# Patient Record
Sex: MALE | Race: ASIAN | Employment: STUDENT | URBAN - METROPOLITAN AREA
[De-identification: names, ages, dates, MRNs, and addresses within clinical notes are randomized per-mention and may not be internally consistent; named-entity substitution may affect disease eponyms.]

---
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
## 2020-09-04 ENCOUNTER — OFFICE VISIT (OUTPATIENT)
Dept: INTERNAL MEDICINE CLINIC | Facility: CLINIC | Age: 20
End: 2020-09-04
Payer: COMMERCIAL

## 2020-09-04 VITALS
DIASTOLIC BLOOD PRESSURE: 60 MMHG | TEMPERATURE: 98 F | SYSTOLIC BLOOD PRESSURE: 110 MMHG | HEART RATE: 60 BPM | HEIGHT: 71.25 IN | WEIGHT: 162 LBS | BODY MASS INDEX: 22.43 KG/M2

## 2020-09-04 DIAGNOSIS — Z02.5 ROUTINE SPORTS PHYSICAL EXAM: Primary | ICD-10-CM

## 2020-09-04 PROCEDURE — 99385 PREV VISIT NEW AGE 18-39: CPT | Performed by: NURSE PRACTITIONER

## 2020-09-04 PROCEDURE — 3078F DIAST BP <80 MM HG: CPT | Performed by: NURSE PRACTITIONER

## 2020-09-04 PROCEDURE — 3074F SYST BP LT 130 MM HG: CPT | Performed by: NURSE PRACTITIONER

## 2020-09-04 PROCEDURE — 3008F BODY MASS INDEX DOCD: CPT | Performed by: NURSE PRACTITIONER

## 2020-09-04 NOTE — PROGRESS NOTES
CHIEF COMPLAINT:   Patient presents with:  Sports Physical: not fasting        HPI:   Joel Lindsay is a 23year old male who presents for a sports physical exam. Patient will be participating in soccer.   Patient attends school and is a sophomore in college PSYCHE: no symptoms of depression or anxiety  HEMATOLOGY: denies hx anemia; denies bruising or excessive bleeding  ALLERGY/IMM.: denies food or seasonal allergies    EXAM:   /60 (BP Location: Left arm, Patient Position: Sitting, Cuff Size: adult) Status current - will obtain records    Patient is cleared for sports without restrictions. Form filled out and given to patient/parent. Copy of form sent to be scanned into patient's chart.    The patient is asked to return or see PCP for CPX in 1 year i

## 2021-01-05 ENCOUNTER — OFFICE VISIT (OUTPATIENT)
Dept: INTERNAL MEDICINE CLINIC | Facility: CLINIC | Age: 21
End: 2021-01-05
Payer: COMMERCIAL

## 2021-01-05 VITALS
HEIGHT: 71.25 IN | SYSTOLIC BLOOD PRESSURE: 106 MMHG | HEART RATE: 56 BPM | WEIGHT: 167 LBS | DIASTOLIC BLOOD PRESSURE: 66 MMHG | RESPIRATION RATE: 14 BRPM | BODY MASS INDEX: 23.12 KG/M2

## 2021-01-05 DIAGNOSIS — S76.111A STRAIN OF RIGHT QUADRICEPS MUSCLE, INITIAL ENCOUNTER: Primary | ICD-10-CM

## 2021-01-05 PROCEDURE — 99213 OFFICE O/P EST LOW 20 MIN: CPT | Performed by: NURSE PRACTITIONER

## 2021-01-05 PROCEDURE — 3074F SYST BP LT 130 MM HG: CPT | Performed by: NURSE PRACTITIONER

## 2021-01-05 PROCEDURE — 3008F BODY MASS INDEX DOCD: CPT | Performed by: NURSE PRACTITIONER

## 2021-01-05 PROCEDURE — 3078F DIAST BP <80 MM HG: CPT | Performed by: NURSE PRACTITIONER

## 2021-01-05 NOTE — PROGRESS NOTES
CHIEF COMPLAINT:     Patient presents with:  Groin Pain: 3x weeks right side has been lifting weights prior . OTC stretching, ibuprofen       HPI:   Isha Randhawa is a 21year old male here with c/o R side groin pain.  Was playing soccer and heard a pop in his

## 2021-01-05 NOTE — PATIENT INSTRUCTIONS
Groin Strain (Adult)  A groin strain is a stretching or partial tearing of the muscle in the lower belly (abdomen) or upper thigh. This may happen because of too much coughing, heavy lifting, or active sports.  The pain may last for several days or weeks, © 9336-9742 [employer] [address] [facility] [place]. All rights reserved. This information is not intended as a substitute for professional medical care. Always follow your healthcare professional's instructions.

## 2021-01-06 ENCOUNTER — TELEPHONE (OUTPATIENT)
Dept: INTERNAL MEDICINE CLINIC | Facility: CLINIC | Age: 21
End: 2021-01-06

## 2021-01-06 NOTE — TELEPHONE ENCOUNTER
Faxed signed initial evaluation back to [facility]   Fax number [phone]   Fax confirmation received   Sent to scan and copy placed in accordion

## 2021-05-06 ENCOUNTER — HOSPITAL ENCOUNTER (OUTPATIENT)
Age: 21
Discharge: HOME | End: 2021-05-06
Attending: EMERGENCY MEDICINE
Payer: COMMERCIAL

## 2021-05-06 VITALS
OXYGEN SATURATION: 98 % | RESPIRATION RATE: 14 BRPM | SYSTOLIC BLOOD PRESSURE: 140 MMHG | DIASTOLIC BLOOD PRESSURE: 62 MMHG | HEART RATE: 50 BPM | TEMPERATURE: 98 F

## 2021-05-06 DIAGNOSIS — U07.1 COVID-19: Primary | ICD-10-CM

## 2021-05-06 PROCEDURE — 99213 OFFICE O/P EST LOW 20 MIN: CPT

## 2021-05-06 PROCEDURE — 99202 OFFICE O/P NEW SF 15 MIN: CPT

## 2021-05-06 NOTE — ED PROVIDER NOTES
Patient Seen in: [facility]      History   Patient presents with:  Covid-19 Test    Stated Complaint: covid test    HPI/Subjective:   HPI    27-year-old male comes to the hospital complaint of requesting a Covid test.  He is asymptomatic. discussed. The patient is discharged in good condition.                                 Disposition and Plan     Clinical Impression:  [id number]  (primary encounter diagnosis)     Disposition:  Discharge  5/6/2021 12:27 pm    Follow-up:  Deedee Maya

## 2021-05-09 ENCOUNTER — HOSPITAL ENCOUNTER (OUTPATIENT)
Age: 21
Discharge: HOME | End: 2021-05-09
Payer: COMMERCIAL

## 2021-05-09 VITALS
HEART RATE: 51 BPM | OXYGEN SATURATION: 98 % | SYSTOLIC BLOOD PRESSURE: 129 MMHG | DIASTOLIC BLOOD PRESSURE: 70 MMHG | RESPIRATION RATE: 16 BRPM | TEMPERATURE: 99 F

## 2021-05-09 DIAGNOSIS — Z20.822 ENCOUNTER FOR LABORATORY TESTING FOR COVID-19 VIRUS: Primary | ICD-10-CM

## 2021-05-09 PROCEDURE — 99202 OFFICE O/P NEW SF 15 MIN: CPT | Performed by: NURSE PRACTITIONER

## 2021-05-09 NOTE — ED PROVIDER NOTES
Patient Seen in: [facility] [address] [place], [place] [address]      History   Patient presents with:  Covid-19 Test: PCR Test for Travel - Entered by patient    Stated Complaint: Covid-19 Test - PCR Test for Travel    HPI/Subjective:   HPI  66-year-old male presents t pm    Follow-up:  Perez Pelayo MD  [address] [place] [address]  [phone]    Call   As needed          Medications Prescribed:  There are no discharge medications for this patient.

## 2021-06-10 ENCOUNTER — TELEPHONE (OUTPATIENT)
Dept: INTERNAL MEDICINE CLINIC | Facility: CLINIC | Age: 21
End: 2021-06-10

## 2021-06-10 DIAGNOSIS — S76.319A HAMSTRING STRAIN, UNSPECIFIED LATERALITY, INITIAL ENCOUNTER: Primary | ICD-10-CM

## 2021-06-10 NOTE — TELEPHONE ENCOUNTER
Patients mother called in requesting a referral to physical therapist.     Patient has a pulled hamstring , [facility] on [address]  Fax [phone]

## 2021-06-17 ENCOUNTER — TELEPHONE (OUTPATIENT)
Dept: INTERNAL MEDICINE CLINIC | Facility: CLINIC | Age: 21
End: 2021-06-17

## 2021-06-17 DIAGNOSIS — Z20.822 ENCOUNTER FOR SCREENING LABORATORY TESTING FOR COVID-19 VIRUS: Primary | ICD-10-CM

## 2021-06-17 NOTE — TELEPHONE ENCOUNTER
Patient mother made aware of order and provided with central scheduling phone number.  States she will try local pharmacies for rapid covid test.

## 2021-06-17 NOTE — TELEPHONE ENCOUNTER
We do not have the ability to order rapid COVID tests. Usually our COVID tests take 1-2 days to come back. Can call central scheduling at [phone] to schedule. For a rapid test he is better off trying a travel clinic or pharmacies.   I believe [facility]

## 2021-06-17 NOTE — TELEPHONE ENCOUNTER
Patients mother called in requesting orders be placed for a PCR COVID TEST and RAPID COVID test. The patient will be leaving to [place]. Please call the mother and advise the orders are in.

## 2021-06-18 ENCOUNTER — LAB ENCOUNTER (OUTPATIENT)
Dept: LAB | Age: 21
End: 2021-06-18
Attending: INTERNAL MEDICINE
Payer: COMMERCIAL

## 2021-06-18 DIAGNOSIS — Z20.822 ENCOUNTER FOR SCREENING LABORATORY TESTING FOR COVID-19 VIRUS: ICD-10-CM

## 2022-05-31 ENCOUNTER — TELEPHONE (OUTPATIENT)
Dept: INTERNAL MEDICINE CLINIC | Facility: CLINIC | Age: 22
End: 2022-05-31

## 2022-05-31 ENCOUNTER — HOSPITAL ENCOUNTER (OUTPATIENT)
Dept: GENERAL RADIOLOGY | Age: 22
Discharge: HOME | End: 2022-05-31
Attending: PHYSICIAN ASSISTANT
Payer: COMMERCIAL

## 2022-05-31 ENCOUNTER — OFFICE VISIT (OUTPATIENT)
Dept: INTERNAL MEDICINE CLINIC | Facility: CLINIC | Age: 22
End: 2022-05-31
Payer: COMMERCIAL

## 2022-05-31 VITALS
RESPIRATION RATE: 16 BRPM | SYSTOLIC BLOOD PRESSURE: 121 MMHG | BODY MASS INDEX: 23.71 KG/M2 | WEIGHT: 169.38 LBS | DIASTOLIC BLOOD PRESSURE: 80 MMHG | HEART RATE: 51 BPM | TEMPERATURE: 98 F | HEIGHT: 71 IN | OXYGEN SATURATION: 99 %

## 2022-05-31 DIAGNOSIS — Z00.00 ANNUAL PHYSICAL EXAM: Primary | ICD-10-CM

## 2022-05-31 DIAGNOSIS — M79.672 ACUTE FOOT PAIN, LEFT: ICD-10-CM

## 2022-05-31 PROCEDURE — 3008F BODY MASS INDEX DOCD: CPT | Performed by: PHYSICIAN ASSISTANT

## 2022-05-31 PROCEDURE — 3079F DIAST BP 80-89 MM HG: CPT | Performed by: PHYSICIAN ASSISTANT

## 2022-05-31 PROCEDURE — 99213 OFFICE O/P EST LOW 20 MIN: CPT | Performed by: PHYSICIAN ASSISTANT

## 2022-05-31 PROCEDURE — 3074F SYST BP LT 130 MM HG: CPT | Performed by: PHYSICIAN ASSISTANT

## 2022-05-31 PROCEDURE — 99395 PREV VISIT EST AGE 18-39: CPT | Performed by: PHYSICIAN ASSISTANT

## 2022-05-31 PROCEDURE — 73630 X-RAY EXAM OF FOOT: CPT | Performed by: PHYSICIAN ASSISTANT

## 2022-05-31 NOTE — TELEPHONE ENCOUNTER
Faxed completed medical records to Dr Orlando Campuzano at [phone]    Received confirmation.  LL will hold on to form

## 2022-05-31 NOTE — PATIENT INSTRUCTIONS
Foot x-ray today. Ok to walk but if pain increasing please get crutches.     Follow up with orthopedics (Dr. Neri Mclaughlin):  [address] [facility]  [address] [facility], [address] [facility]  Phone: [phone]

## 2022-06-01 ENCOUNTER — PATIENT MESSAGE (OUTPATIENT)
Dept: INTERNAL MEDICINE CLINIC | Facility: CLINIC | Age: 22
End: 2022-06-01

## 2022-10-03 ENCOUNTER — TELEMEDICINE (OUTPATIENT)
Dept: INTERNAL MEDICINE CLINIC | Facility: CLINIC | Age: 22
End: 2022-10-03

## 2022-10-03 DIAGNOSIS — R09.81 NASAL CONGESTION: ICD-10-CM

## 2022-10-03 DIAGNOSIS — R05.8 COUGH PRESENT FOR GREATER THAN 3 WEEKS: Primary | ICD-10-CM

## 2022-10-03 DIAGNOSIS — R06.09 DYSPNEA ON EXERTION: ICD-10-CM

## 2022-10-03 RX ORDER — BENZONATATE 100 MG/1
100 CAPSULE ORAL 3 TIMES DAILY PRN
Qty: 30 CAPSULE | Refills: 0 | Status: SHIPPED | OUTPATIENT
Start: 2022-10-03

## 2022-10-03 RX ORDER — FLUTICASONE PROPIONATE 50 MCG
2 SPRAY, SUSPENSION (ML) NASAL DAILY
Qty: 1 EACH | Refills: 0 | Status: SHIPPED | OUTPATIENT
Start: 2022-10-03

## 2022-10-03 RX ORDER — AZITHROMYCIN 250 MG/1
TABLET, FILM COATED ORAL
Qty: 6 TABLET | Refills: 0 | Status: SHIPPED | OUTPATIENT
Start: 2022-10-03 | End: 2022-10-08

## 2022-10-03 NOTE — PROGRESS NOTES
John Meredith is a 25year old male here today for a telemedicine/video visit. Patient is in the state of [place] during this visit. John Meredith verbally consents to a Video visit service on 10/03/22. Patient understands and accepts financial responsibility for any deductible, co-insurance and/or co-pays associated with this service. Duration of the service: 5 minutes  Start time: 1:50 PM  End time: 1:55 PM    HPI:  Patient presents with several acute symptoms. He woke up a month ago with cough, nasal congestion. These symptoms have continued for the past month. Chest congestion as well. No sinus pain/pressure. No ear pain/pressure. Now also dealing with dyspnea on exertion. For example when he exercises he is now getting short of breath more easily compared to a month ago. No fevers/chills/night sweats. Past medical, surgical, family, and social histories were reviewed and are unchanged from previous visit. ROS:  GENERAL: denies fevers/chills/sweats  SKIN: denies any unusual skin lesions  EYES: denies blurred vision or double vision  HEENT: nasal congestion  LUNGS: cough, chest congestion, dyspnea on exertion  CARDIOVASCULAR: denies chest pain on exertion  GI: denies abdominal pain, denies heartburn, denies diarrhea  MUSCULOSKELETAL: denies back pain, denies body aches  NEURO: denies headaches    EXAM:  GENERAL: Alert and oriented, well developed, well nourished,in no apparent distress  SKIN: no rashes,no suspicious lesions of face  HEENT: atraumatic, EOMI, normal lid and conjunctiva  NECK: no grossly visible jvd or thyromegaly  LUNGS: speaking in full sentences, no increased work of breathing, no audible wheezing  NEURO: alert and oriented x 3  PSYCH: pleasant, appropriate mood and affect    ASSESSMENT/PLAN:  1. Cough present for greater than 3 weeks  2. Nasal congestion  3. Dyspnea on exertion  Patient with cough, nasal congestion for the past month.   Now also dealing with dyspnea on exertion. No improvement with OTC therapies. Will start on azithromycin, benzonatate, fluticasone nasal spray. Advised patient to contact us if symptoms not better within a week - may consider further work up (labs, chest x-ray) at that point.  - azithromycin 250 MG Oral Tab; Take 2 tablets (500 mg total) by mouth daily for 1 day, THEN 1 tablet (250 mg total) daily for 4 days. Dispense: 6 tablet; Refill: 0  - benzonatate 100 MG Oral Cap; Take 1 capsule (100 mg total) by mouth 3 (three) times daily as needed for cough. Dispense: 30 capsule; Refill: 0  - fluticasone propionate 50 MCG/ACT Nasal Suspension; 2 sprays by Each Nare route daily. Dispense: 1 each; Refill: 0    Return to clinic as needed. Please note that the following visit was completed using two-way, real-time interactive audio and video communication. This has been done in good aj to provide continuity of care in the best interest of the provider-patient relationship, due to the ongoing public health crisis/national emergency and because of restrictions of visitation. There are limitations of this visit as a complete head to toe physical exam could not be performed. Every conscious effort was taken to allow for sufficient and adequate time. This billing was spent on reviewing labs, medications, radiology tests and decision making. Appropriate medical decision-making and tests are ordered as detailed in the plan of care above.

## 2022-12-07 ENCOUNTER — PATIENT MESSAGE (OUTPATIENT)
Dept: INTERNAL MEDICINE CLINIC | Facility: CLINIC | Age: 22
End: 2022-12-07

## 2022-12-07 ENCOUNTER — HOSPITAL ENCOUNTER (OUTPATIENT)
Dept: GENERAL RADIOLOGY | Age: 22
Discharge: HOME | End: 2022-12-07
Attending: INTERNAL MEDICINE
Payer: COMMERCIAL

## 2022-12-07 ENCOUNTER — TELEPHONE (OUTPATIENT)
Dept: INTERNAL MEDICINE CLINIC | Facility: CLINIC | Age: 22
End: 2022-12-07

## 2022-12-07 DIAGNOSIS — M79.671 ACUTE FOOT PAIN, RIGHT: ICD-10-CM

## 2022-12-07 DIAGNOSIS — M79.671 ACUTE FOOT PAIN, RIGHT: Primary | ICD-10-CM

## 2022-12-07 PROCEDURE — 73630 X-RAY EXAM OF FOOT: CPT | Performed by: INTERNAL MEDICINE

## 2022-12-07 NOTE — TELEPHONE ENCOUNTER
Spoke to mother Caio Chao, informed her x-ray order placed by RP. Appointment has to be made with CS, mother stated she knows the phone number. Thanked us for the call back! Destruction After The Procedure: No

## 2022-12-07 NOTE — TELEPHONE ENCOUNTER
Pt mother requesting an order for an x-ray of the patients R foot, recommended by the physical therapist due to pt being in a lot of pain. Mom is asking if x-ray can be ordered for pt to have it done tomorrow? Advised MD is out of office today but will enter as a high priority.

## 2023-01-03 ENCOUNTER — HOSPITAL ENCOUNTER (OUTPATIENT)
Dept: GENERAL RADIOLOGY | Age: 23
Discharge: HOME | End: 2023-01-03
Attending: INTERNAL MEDICINE
Payer: COMMERCIAL

## 2023-01-03 DIAGNOSIS — M25.562 ACUTE PAIN OF LEFT KNEE: ICD-10-CM

## 2023-01-03 PROCEDURE — 73562 X-RAY EXAM OF KNEE 3: CPT | Performed by: INTERNAL MEDICINE

## 2023-01-06 ENCOUNTER — PATIENT MESSAGE (OUTPATIENT)
Dept: INTERNAL MEDICINE CLINIC | Facility: CLINIC | Age: 23
End: 2023-01-06

## 2023-01-06 NOTE — TELEPHONE ENCOUNTER
From: Maeve Coats  To: Rakesh Hurtado MD  Sent: 1/6/2023 12:19 AM CST  Subject: Pink eye    Hello Mr. Xiomara Munoz, sorry for bothering again, but I have pink eye. What should I do or what kind of medicine should i take? Can you prescribe me any antibiotics? Thank you!

## 2023-02-20 ENCOUNTER — PATIENT MESSAGE (OUTPATIENT)
Dept: INTERNAL MEDICINE CLINIC | Facility: CLINIC | Age: 23
End: 2023-02-20

## 2023-02-21 ENCOUNTER — HOSPITAL ENCOUNTER (OUTPATIENT)
Dept: GENERAL RADIOLOGY | Age: 23
Discharge: HOME | End: 2023-02-21
Attending: INTERNAL MEDICINE
Payer: COMMERCIAL

## 2023-02-21 DIAGNOSIS — M79.671 RIGHT FOOT PAIN: ICD-10-CM

## 2023-02-21 PROCEDURE — 73630 X-RAY EXAM OF FOOT: CPT | Performed by: INTERNAL MEDICINE

## 2023-08-22 ENCOUNTER — OFFICE VISIT (OUTPATIENT)
Dept: INTERNAL MEDICINE CLINIC | Facility: CLINIC | Age: 23
End: 2023-08-22
Payer: COMMERCIAL

## 2023-08-22 ENCOUNTER — LAB ENCOUNTER (OUTPATIENT)
Dept: LAB | Age: 23
End: 2023-08-22
Attending: INTERNAL MEDICINE
Payer: COMMERCIAL

## 2023-08-22 VITALS
WEIGHT: 169.63 LBS | SYSTOLIC BLOOD PRESSURE: 120 MMHG | OXYGEN SATURATION: 98 % | HEIGHT: 71.22 IN | RESPIRATION RATE: 14 BRPM | HEART RATE: 50 BPM | DIASTOLIC BLOOD PRESSURE: 70 MMHG | BODY MASS INDEX: 23.49 KG/M2

## 2023-08-22 DIAGNOSIS — R73.01 ELEVATED FASTING GLUCOSE: Primary | ICD-10-CM

## 2023-08-22 DIAGNOSIS — Z00.00 ENCOUNTER FOR PREVENTATIVE ADULT HEALTH CARE EXAMINATION: ICD-10-CM

## 2023-08-22 DIAGNOSIS — Z00.00 ENCOUNTER FOR PREVENTATIVE ADULT HEALTH CARE EXAMINATION: Primary | ICD-10-CM

## 2023-08-22 DIAGNOSIS — Z23 NEED FOR DIPHTHERIA-TETANUS-PERTUSSIS (TDAP) VACCINE: ICD-10-CM

## 2023-08-22 DIAGNOSIS — Z23 NEED FOR HPV VACCINATION: ICD-10-CM

## 2023-08-22 DIAGNOSIS — R73.01 ELEVATED FASTING GLUCOSE: ICD-10-CM

## 2023-08-22 LAB
ALBUMIN SERPL-MCNC: 4 G/DL (ref 3.4–5)
ALBUMIN/GLOB SERPL: 1.2 {RATIO} (ref 1–2)
ALP LIVER SERPL-CCNC: 70 U/L
ALT SERPL-CCNC: 60 U/L
ANION GAP SERPL CALC-SCNC: 5 MMOL/L (ref 0–18)
AST SERPL-CCNC: 67 U/L (ref 15–37)
BASOPHILS # BLD AUTO: 0.04 X10(3) UL (ref 0–0.2)
BASOPHILS NFR BLD AUTO: 0.6 %
BILIRUB SERPL-MCNC: 0.7 MG/DL (ref 0.1–2)
BUN BLD-MCNC: 21 MG/DL (ref 7–18)
CALCIUM BLD-MCNC: 9.4 MG/DL (ref 8.5–10.1)
CHLORIDE SERPL-SCNC: 103 MMOL/L (ref 98–112)
CHOLEST SERPL-MCNC: 166 MG/DL (ref ?–200)
CO2 SERPL-SCNC: 31 MMOL/L (ref 21–32)
CREAT BLD-MCNC: 1.14 MG/DL
EGFRCR SERPLBLD CKD-EPI 2021: 93 ML/MIN/1.73M2 (ref 60–?)
EOSINOPHIL # BLD AUTO: 0.37 X10(3) UL (ref 0–0.7)
EOSINOPHIL NFR BLD AUTO: 5.8 %
ERYTHROCYTE [DISTWIDTH] IN BLOOD BY AUTOMATED COUNT: 12.8 %
EST. AVERAGE GLUCOSE BLD GHB EST-MCNC: 123 MG/DL (ref 68–126)
FASTING PATIENT LIPID ANSWER: YES
FASTING STATUS PATIENT QL REPORTED: YES
GLOBULIN PLAS-MCNC: 3.4 G/DL (ref 2.8–4.4)
GLUCOSE BLD-MCNC: 103 MG/DL (ref 70–99)
HBA1C MFR BLD: 5.9 % (ref ?–5.7)
HCT VFR BLD AUTO: 43 %
HDLC SERPL-MCNC: 67 MG/DL (ref 40–59)
HGB BLD-MCNC: 14.1 G/DL
IMM GRANULOCYTES # BLD AUTO: 0.02 X10(3) UL (ref 0–1)
IMM GRANULOCYTES NFR BLD: 0.3 %
LDLC SERPL CALC-MCNC: 85 MG/DL (ref ?–100)
LYMPHOCYTES # BLD AUTO: 1.4 X10(3) UL (ref 1–4)
LYMPHOCYTES NFR BLD AUTO: 22 %
MCH RBC QN AUTO: 29.4 PG (ref 26–34)
MCHC RBC AUTO-ENTMCNC: 32.8 G/DL (ref 31–37)
MCV RBC AUTO: 89.6 FL
MONOCYTES # BLD AUTO: 0.42 X10(3) UL (ref 0.1–1)
MONOCYTES NFR BLD AUTO: 6.6 %
NEUTROPHILS # BLD AUTO: 4.1 X10 (3) UL (ref 1.5–7.7)
NEUTROPHILS # BLD AUTO: 4.1 X10(3) UL (ref 1.5–7.7)
NEUTROPHILS NFR BLD AUTO: 64.7 %
NONHDLC SERPL-MCNC: 99 MG/DL (ref ?–130)
OSMOLALITY SERPL CALC.SUM OF ELEC: 291 MOSM/KG (ref 275–295)
PLATELET # BLD AUTO: 222 10(3)UL (ref 150–450)
POTASSIUM SERPL-SCNC: 4.2 MMOL/L (ref 3.5–5.1)
PROT SERPL-MCNC: 7.4 G/DL (ref 6.4–8.2)
RBC # BLD AUTO: 4.8 X10(6)UL
SODIUM SERPL-SCNC: 139 MMOL/L (ref 136–145)
TRIGL SERPL-MCNC: 71 MG/DL (ref 30–149)
TSI SER-ACNC: 0.97 MIU/ML (ref 0.36–3.74)
VLDLC SERPL CALC-MCNC: 11 MG/DL (ref 0–30)
WBC # BLD AUTO: 6.4 X10(3) UL (ref 4–11)

## 2023-08-22 PROCEDURE — 90715 TDAP VACCINE 7 YRS/> IM: CPT | Performed by: INTERNAL MEDICINE

## 2023-08-22 PROCEDURE — 99395 PREV VISIT EST AGE 18-39: CPT | Performed by: INTERNAL MEDICINE

## 2023-08-22 PROCEDURE — 90471 IMMUNIZATION ADMIN: CPT | Performed by: INTERNAL MEDICINE

## 2023-08-22 PROCEDURE — 80061 LIPID PANEL: CPT | Performed by: INTERNAL MEDICINE

## 2023-08-22 PROCEDURE — 83036 HEMOGLOBIN GLYCOSYLATED A1C: CPT | Performed by: INTERNAL MEDICINE

## 2023-08-22 PROCEDURE — 3074F SYST BP LT 130 MM HG: CPT | Performed by: INTERNAL MEDICINE

## 2023-08-22 PROCEDURE — 80050 GENERAL HEALTH PANEL: CPT | Performed by: INTERNAL MEDICINE

## 2023-08-22 PROCEDURE — 3008F BODY MASS INDEX DOCD: CPT | Performed by: INTERNAL MEDICINE

## 2023-08-22 PROCEDURE — 3078F DIAST BP <80 MM HG: CPT | Performed by: INTERNAL MEDICINE

## 2023-08-22 NOTE — PATIENT INSTRUCTIONS
- Get blood tests done today  - Tetanus shot given today  - Consider HPV shots (see handout) - but would recommend getting all 3 shots at same place (need a shot initially, second shot in 1-2 months, third shot in 6 months). - Would recommend following up with an ENT (otolaryngologist) for your nasal breathing issues, but do not have to see them urgently. It was a pleasure seeing you in the clinic today. Thank you for choosing the [facility] office for your healthcare needs. Please call at [phone] with any questions or concerns.     Jaymie Patel MD

## 2023-08-23 PROBLEM — R73.03 PREDIABETES: Status: ACTIVE | Noted: 2023-08-23

## 2023-08-23 PROBLEM — N48.0 BXO (BALANITIS XEROTICA OBLITERANS): Status: RESOLVED | Noted: 2017-01-30 | Resolved: 2023-08-23

## 2023-08-23 PROBLEM — N39.0 RECURRENT UTI: Status: RESOLVED | Noted: 2017-01-30 | Resolved: 2023-08-23

## 2023-08-23 PROBLEM — N47.1 REDUNDANT PREPUCE AND PHIMOSIS: Status: RESOLVED | Noted: 2017-01-30 | Resolved: 2023-08-23

## 2023-08-23 PROBLEM — N47.8 REDUNDANT PREPUCE AND PHIMOSIS: Status: RESOLVED | Noted: 2017-01-30 | Resolved: 2023-08-23

## 2023-08-23 PROBLEM — R74.01 ELEVATED ALT MEASUREMENT: Status: ACTIVE | Noted: 2023-08-23

## 2023-10-03 ENCOUNTER — HOSPITAL ENCOUNTER (OUTPATIENT)
Dept: GENERAL RADIOLOGY | Age: 23
Discharge: HOME | End: 2023-10-03
Attending: INTERNAL MEDICINE
Payer: COMMERCIAL

## 2023-10-03 ENCOUNTER — PATIENT MESSAGE (OUTPATIENT)
Dept: INTERNAL MEDICINE CLINIC | Facility: CLINIC | Age: 23
End: 2023-10-03

## 2023-10-03 DIAGNOSIS — M79.671 PAIN OF RIGHT HEEL: ICD-10-CM

## 2023-10-03 DIAGNOSIS — M79.671 PAIN OF RIGHT HEEL: Primary | ICD-10-CM

## 2023-10-03 PROCEDURE — 73650 X-RAY EXAM OF HEEL: CPT | Performed by: INTERNAL MEDICINE

## 2023-10-03 PROCEDURE — 73630 X-RAY EXAM OF FOOT: CPT | Performed by: INTERNAL MEDICINE

## 2023-10-03 NOTE — TELEPHONE ENCOUNTER
X-ray foot/heel ordered.   May need to refer to Podiatry based on x-ray findings and course of symptoms

## 2023-10-03 NOTE — TELEPHONE ENCOUNTER
Patient c/o intermittent pain to right foot heel, feels the left side of heel is worse. Started about 1.5 weeks ago while playing soccer, denies injury. Pain occurs with walking and increases to 10/10 while running/playing soccer. Pain does not radiate. Feels that area is tender to touch. He is able to bear weight on foot. He has applied ice and taken ibuprofen but has not noticed any improvement in symptoms. Currently he does not have any pain. Denies bruising, redness, swelling, numbness/tingling, coldness/warmth. Has a friend that is a therapist and recommended he talk to his PCP. Please advise, appt - soonest opening is on Fri/imaging?

## 2023-10-03 NOTE — TELEPHONE ENCOUNTER
From: Alondra Juarez  To: Zoraida Tobias  Sent: 10/3/2023 9:33 AM CDT  Subject: referral for x-ray for heel    Hello Mr. Jc Tobias, hope everything is well, I started to feel some sharp pain after my game in my heel. I was wondering if you can write me a referral as i think i might have a stress fracture in my heel. I would love to get this x ray done as soon as possible. Thank you, much appreciated as always.

## 2023-10-04 ENCOUNTER — PATIENT MESSAGE (OUTPATIENT)
Dept: INTERNAL MEDICINE CLINIC | Facility: CLINIC | Age: 23
End: 2023-10-04

## 2023-10-09 ENCOUNTER — OFFICE VISIT (OUTPATIENT)
Facility: CLINIC | Age: 23
End: 2023-10-09
Payer: COMMERCIAL

## 2023-10-09 VITALS
HEIGHT: 72 IN | SYSTOLIC BLOOD PRESSURE: 110 MMHG | DIASTOLIC BLOOD PRESSURE: 68 MMHG | WEIGHT: 175 LBS | HEART RATE: 56 BPM | BODY MASS INDEX: 23.7 KG/M2 | RESPIRATION RATE: 18 BRPM | OXYGEN SATURATION: 98 %

## 2023-10-09 DIAGNOSIS — J30.2 SEASONAL ALLERGIC RHINITIS, UNSPECIFIED TRIGGER: ICD-10-CM

## 2023-10-09 DIAGNOSIS — R06.02 SHORTNESS OF BREATH: Primary | ICD-10-CM

## 2023-10-09 PROCEDURE — 3078F DIAST BP <80 MM HG: CPT | Performed by: INTERNAL MEDICINE

## 2023-10-09 PROCEDURE — 3008F BODY MASS INDEX DOCD: CPT | Performed by: INTERNAL MEDICINE

## 2023-10-09 PROCEDURE — 99204 OFFICE O/P NEW MOD 45 MIN: CPT | Performed by: INTERNAL MEDICINE

## 2023-10-09 PROCEDURE — 3074F SYST BP LT 130 MM HG: CPT | Performed by: INTERNAL MEDICINE

## 2023-10-09 RX ORDER — AZELASTINE 1 MG/ML
1 SPRAY, METERED NASAL 2 TIMES DAILY
Qty: 30 ML | Refills: 2 | Status: SHIPPED | OUTPATIENT
Start: 2023-10-09

## 2023-10-09 NOTE — TELEPHONE ENCOUNTER
Could be from tendinitis or muscle injury, he may benefit from physical therapy clinic evaluation.   I see he scheduled an appointment for next week so can discuss further then Reports intermittent diarrhea relieved with imodium which pt takes as needed. Reports urgency to defecate. Denied hematochezia, fever/ chills, and  concerns. Has good energy level and appetite. See RN toxicity assessment.

## 2024-05-09 ENCOUNTER — LAB ENCOUNTER (OUTPATIENT)
Dept: LAB | Age: 24
End: 2024-05-09
Attending: INTERNAL MEDICINE
Payer: COMMERCIAL

## 2024-05-09 ENCOUNTER — OFFICE VISIT (OUTPATIENT)
Dept: INTERNAL MEDICINE CLINIC | Facility: CLINIC | Age: 24
End: 2024-05-09
Payer: COMMERCIAL

## 2024-05-09 VITALS
WEIGHT: 177.63 LBS | HEIGHT: 71 IN | OXYGEN SATURATION: 98 % | TEMPERATURE: 98 F | RESPIRATION RATE: 12 BRPM | HEART RATE: 61 BPM | DIASTOLIC BLOOD PRESSURE: 66 MMHG | BODY MASS INDEX: 24.87 KG/M2 | SYSTOLIC BLOOD PRESSURE: 104 MMHG

## 2024-05-09 DIAGNOSIS — R74.8 ELEVATED CREATINE KINASE LEVEL: ICD-10-CM

## 2024-05-09 DIAGNOSIS — R25.2 BILATERAL LEG CRAMPS: ICD-10-CM

## 2024-05-09 DIAGNOSIS — R25.2 BILATERAL LEG CRAMPS: Primary | ICD-10-CM

## 2024-05-09 LAB
CK SERPL-CCNC: 1361 U/L
IRON SATN MFR SERPL: 24 %
IRON SERPL-MCNC: 70 UG/DL
TIBC SERPL-MCNC: 292 UG/DL (ref 250–425)
TRANSFERRIN SERPL-MCNC: 196 MG/DL (ref 215–365)
VIT B12 SERPL-MCNC: 662 PG/ML (ref 211–911)
VIT D+METAB SERPL-MCNC: 31.4 NG/ML (ref 30–100)

## 2024-05-09 PROCEDURE — 82550 ASSAY OF CK (CPK): CPT | Performed by: INTERNAL MEDICINE

## 2024-05-09 PROCEDURE — 3008F BODY MASS INDEX DOCD: CPT | Performed by: INTERNAL MEDICINE

## 2024-05-09 PROCEDURE — 82607 VITAMIN B-12: CPT | Performed by: INTERNAL MEDICINE

## 2024-05-09 PROCEDURE — 83540 ASSAY OF IRON: CPT | Performed by: INTERNAL MEDICINE

## 2024-05-09 PROCEDURE — 84466 ASSAY OF TRANSFERRIN: CPT | Performed by: INTERNAL MEDICINE

## 2024-05-09 PROCEDURE — 82306 VITAMIN D 25 HYDROXY: CPT | Performed by: INTERNAL MEDICINE

## 2024-05-09 PROCEDURE — 80048 BASIC METABOLIC PNL TOTAL CA: CPT | Performed by: INTERNAL MEDICINE

## 2024-05-09 PROCEDURE — 3078F DIAST BP <80 MM HG: CPT | Performed by: INTERNAL MEDICINE

## 2024-05-09 PROCEDURE — 3074F SYST BP LT 130 MM HG: CPT | Performed by: INTERNAL MEDICINE

## 2024-05-09 PROCEDURE — 99213 OFFICE O/P EST LOW 20 MIN: CPT | Performed by: INTERNAL MEDICINE

## 2024-05-09 NOTE — PROGRESS NOTES
Ciro Iyer is a 23 year old male.   HPI:   Patient presents with leg cramping/leg pain symptoms. This has been going on for several months.  He plays soccer, feels cramping by halftime of games.  No issues day to day, no issues with marathon training, practices.  Only in the games.  Pain is from the hamstring all the way down to the calf.     Past medical, family, surgical and social history were reviewed as listed in the chart, and are unchanged from previous visit.  REVIEW OF SYSTEMS:   GENERAL/ const: no fevers/chills, no unintentional weight loss  SKIN: denies any unusual skin lesions  EYES:no vision problems  HEENT: occasional nasal congestion; denies sinus pain or sinus tenderness  LUNGS: denies shortness of breath   CARDIOVASCULAR: denies chest pain  GI: denies nausea/emesis/ abdominal pain diarrhea constipation  : denies dysuria   MUSCULOSKELETAL: leg cramping  NEURO: denies headaches  PSYCHIATRIC: denies issues  ENDOCRINE: no hot/cold intolerance  ALLERGY: No Known Allergies  PAST HISTORY:     Current Outpatient Medications:     MAGNESIUM OR, Take 1 tablet by mouth daily., Disp: , Rfl:     azelastine 0.1 % Nasal Solution, 1 spray by Nasal route 2 (two) times daily., Disp: 30 mL, Rfl: 2  Medical:  has a past medical history of 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  Surgical:  has a past surgical history that includes circumcision,othr (07/18/2017).  Family: family history includes Cancer in his paternal grandmother; Diabetes in his father and maternal grandmother; Hypertension in his maternal grandmother and mother; No Known Problems in his maternal grandfather and sister.  Social:  reports that he has never smoked. He has never used smokeless tobacco. He reports that he does not drink alcohol and does not use drugs.  Wt Readings from Last 6 Encounters:   05/09/24 177 lb 9.6 oz  (80.6 kg)   10/09/23 175 lb (79.4 kg)   08/22/23 169 lb 9.6 oz (76.9 kg)   05/31/22 169 lb 6.4 oz (76.8 kg)   01/05/21 167 lb (75.8 kg)   09/04/20 162 lb (73.5 kg) (60%, Z= 0.25)*     * Growth percentiles are based on [facility] (Boys, 2-20 Years) data.     EXAM:   /66 (BP Location: Left arm, Patient Position: Sitting, Cuff Size: adult)   Pulse 61   Temp 97.9 °F (36.6 °C) (Temporal)   Resp 12   Ht 5' 11\" (1.803 m)   Wt 177 lb 9.6 oz (80.6 kg)   SpO2 98%   BMI 24.77 kg/m²   GENERAL: Alert and oriented, well developed, well nourished,in no apparent distress  HEENT: atraumatic, PERRLA, EOMI, normal lid and conjunctiva  LUNGS: clear to auscultation bilaterally, no wheezing/rubs  CARDIO: RRR without murmurs.  No clubbing, cyanosis or edema.  GI: soft non tender nondistended no hepatosplenomegaly, bowel sounds throughout  NEURO: CN II-XII intact, 5/5 strength all extremities  MS: Full ROM, no joint pain  PSYCH: pleasant, appropriate mood and affect  ASSESSMENT AND PLAN:   1. Bilateral leg cramps  Patient with persistent cramping in legs when playing soccer.  No pain/symptoms with day to day activities or even practice, marathon training running etc.  Only when playing full soccer games.  From hamstrings down to calves bilaterally.  Will check labs as below.  Will also have patient follow up with Physiatry.  - CK (Creatine Kinase) (Not Creatinine) (E); Future  - Iron And Tibc; Future  - Vitamin B12; Future  - Vitamin D; Future  - Physiatry Referral - In Network    Patient Care Team:  Zoraida Villegas MD as PCP - General (Internal Medicine)  The patient indicates understanding of these issues and agrees to the plan.  The patient is asked to return to clinic in 6-12 months for follow up on chronic issues, or earlier if acute issues arise.    Zoraida Villegas MD

## 2024-05-09 NOTE — PATIENT INSTRUCTIONS
- Get blood tests done today  - Schedule appointment with our Physiatry specialists; will have you follow up with them if blood tests done give a good explanation for your cramping symptoms:  Dr. Maya and Dr. Dick  [address] [place] [address]  [phone]    It was a pleasure seeing you in the clinic today.  Thank you for choosing the [facility] office for your healthcare needs. Please call at [phone] with any questions or concerns.    Zoraida Villegas MD

## 2024-05-10 ENCOUNTER — PATIENT MESSAGE (OUTPATIENT)
Dept: INTERNAL MEDICINE CLINIC | Facility: CLINIC | Age: 24
End: 2024-05-10

## 2024-05-10 DIAGNOSIS — R74.8 ELEVATED CREATINE KINASE LEVEL: Primary | ICD-10-CM

## 2024-05-10 PROBLEM — M72.2 PLANTAR FASCIAL FIBROMATOSIS OF RIGHT FOOT: Status: RESOLVED | Noted: 2023-10-24 | Resolved: 2024-05-10

## 2024-05-10 PROBLEM — M72.2 PLANTAR FASCIAL FIBROMATOSIS OF RIGHT FOOT: Status: ACTIVE | Noted: 2023-10-24

## 2024-05-10 LAB
ANION GAP SERPL CALC-SCNC: 6 MMOL/L (ref 0–18)
BUN BLD-MCNC: 26 MG/DL (ref 9–23)
BUN/CREAT SERPL: 22.8 (ref 10–20)
CALCIUM BLD-MCNC: 10 MG/DL (ref 8.7–10.4)
CHLORIDE SERPL-SCNC: 105 MMOL/L (ref 98–112)
CO2 SERPL-SCNC: 29 MMOL/L (ref 21–32)
CREAT BLD-MCNC: 1.14 MG/DL
EGFRCR SERPLBLD CKD-EPI 2021: 93 ML/MIN/1.73M2 (ref 60–?)
GLUCOSE BLD-MCNC: 105 MG/DL (ref 70–99)
OSMOLALITY SERPL CALC.SUM OF ELEC: 295 MOSM/KG (ref 275–295)
POTASSIUM SERPL-SCNC: 4.6 MMOL/L (ref 3.5–5.1)
SODIUM SERPL-SCNC: 140 MMOL/L (ref 136–145)

## 2024-05-10 NOTE — TELEPHONE ENCOUNTER
Not really other recommendations at this time as far as preventing cramping.  With his CK blood test being high my concern is further heavy physical activity could make things worse.  He could see if Dr. Maya/Mayelin have earlier appointments available at their [place] location

## 2024-05-10 NOTE — TELEPHONE ENCOUNTER
Aside from maintaining hydrated any other recommendations to avoid cramping during games?   Has appt with Dr Maya on 5/24      Hello,  Kidney blood tests are still normal.  As mentioned on previous note make sure to drink plenty of water, hold off on heavy exercise for now until you see the specialist Dr. Maya. Let me know if you have questions about these results.  Take care,  Dr. Villegas   Written by Zoraida Villegas MD on 5/10/2024  9:55 AM CDT  Seen by patient Ciro Iyer on 5/10/2024 10:18 AM

## 2024-05-10 NOTE — TELEPHONE ENCOUNTER
From: Ciro Iyer  To: Zoraida Villegas  Sent: 5/10/2024 10:37 AM CDT  Subject: Jaylon Villegas, thank you for explaining the test results for me, the only problem is that i have some important games coming next week and i can not cramp up, is there any way you can get me the appointment with the specialist sooner? because there is so much competition in that team that if i cramp up the  will bench me.

## 2024-05-24 ENCOUNTER — LAB ENCOUNTER (OUTPATIENT)
Dept: LAB | Age: 24
End: 2024-05-24
Attending: PHYSICAL MEDICINE & REHABILITATION

## 2024-05-24 ENCOUNTER — OFFICE VISIT (OUTPATIENT)
Dept: PHYSICAL MEDICINE AND REHAB | Facility: CLINIC | Age: 24
End: 2024-05-24

## 2024-05-24 VITALS — BODY MASS INDEX: 24.78 KG/M2 | RESPIRATION RATE: 18 BRPM | HEIGHT: 71 IN | WEIGHT: 177 LBS

## 2024-05-24 DIAGNOSIS — R25.2 MUSCLE CRAMPING: ICD-10-CM

## 2024-05-24 DIAGNOSIS — R25.2 MUSCLE CRAMPING: Primary | ICD-10-CM

## 2024-05-24 LAB
ALP LIVER SERPL-CCNC: 71 U/L
MAGNESIUM SERPL-MCNC: 2.5 MG/DL (ref 1.6–2.6)

## 2024-05-24 PROCEDURE — 84075 ASSAY ALKALINE PHOSPHATASE: CPT | Performed by: PHYSICAL MEDICINE & REHABILITATION

## 2024-05-24 PROCEDURE — 99204 OFFICE O/P NEW MOD 45 MIN: CPT | Performed by: PHYSICAL MEDICINE & REHABILITATION

## 2024-05-24 PROCEDURE — 3008F BODY MASS INDEX DOCD: CPT | Performed by: PHYSICAL MEDICINE & REHABILITATION

## 2024-05-24 PROCEDURE — 83735 ASSAY OF MAGNESIUM: CPT | Performed by: PHYSICAL MEDICINE & REHABILITATION

## 2024-05-24 NOTE — PROGRESS NOTES
NEW PATIENT VISIT    CHIEF COMPLAINT  Bilateral leg cramping while playing soccer    HISTORY OF PRESENTING ILLNESS  Ciro Iyer is a 23 year old male who presents for evaluation of bilateral leg cramping.  Patient is a  in [place] and has been dealing with cramping in the bilateral posterior legs for the last year or so.  Patient states that around minute 47 of each game he will experience severe cramping in the bilateral hamstrings reaching the calves causing his legs to lock up.  He has difficulty alleviating his cramps usually resulting in him no longer being able to play in the game.    He is able to exercise with practice as well as long distance running without significant limitation however which is interesting.    He has tried multiple interventions in the form of electrolyte supplementation, cramping supplements, has had labs drawn in the past which are unavailable for review, he has tried intense stretching routines both before, during and after games without success.  His current level pain is 0/10    PAST MEDICAL HISTORY  Past Medical History:    BXO (balanitis xerotica obliterans)    Phimosis    Plantar fascial fibromatosis of right foot    Recurrent UTI    Redundant prepuce and phimosis    UTI (urinary tract infection)       PAST SURGICAL HISTORY  Past Surgical History:   Procedure Laterality Date    Circumcision,othr  07/18/2017    H/o recurrent UTI, redundant prepuce, phimosis       MEDICATIONS  Current Outpatient Medications on File Prior to Visit   Medication Sig Dispense Refill    MAGNESIUM OR Take 1 tablet by mouth daily.      azelastine 0.1 % Nasal Solution 1 spray by Nasal route 2 (two) times daily. 30 mL 2     No current facility-administered medications on file prior to visit.       ALLERGIES  No Known Allergies    SOCIAL HISTORY   reports that he has never smoked. He has never been exposed to tobacco smoke. He has never used smokeless tobacco. He reports that he does  not drink alcohol and does not use drugs.    FAMILY HISTORY  Family History   Problem Relation Age of Onset    Diabetes Father     Hypertension Mother     Diabetes Maternal Grandmother     Hypertension Maternal Grandmother     No Known Problems Maternal Grandfather     Cancer Paternal Grandmother         breast CA    No Known Problems Sister        REVIEW OF SYSTEMS  Complete review of systems was performed and was negative except for those items stated in the History of Presenting Illness and Past Medical/Surgical History.    PHYSICAL EXAMINATION  GENERAL:  In no acute distress. Well-developed and well nourished.   SKIN: No rashes or open wounds involving bilateral lower extremities.  NEUROLOGIC:   Strength: 5/5 bilaterally with hip flexion, knee extension, knee flexion, ankle dorsiflexion, ankle eversion, ankle inversion, ankle plantarflexion, and great toe extension.   Sensation: intact light touch sensation throughout both lower extremities.   Reflexes: intact and symmetric in bilateral lower extremities. Babinski downgoing bilaterally. No clonus.   Gait: able to heel walk, toe walk, and perform tandem gait.   MUSCULOSKELETAL:  Inspection: No fasciculations noted on examination.  Reflexes are intact.  Hammering directly to the gastroc produces slight spasm.    REVIEW OF PRIOR X-RAYS/STUDIES  No pertinent imaging to review.    Multiple labs ordered today.  EMG ordered today.  HIMANSHU ordered today.    CK elevated to 1300.  May represent myositis versus normal values as the patient is a high-level athlete.    IMPRESSION/DIAGNOSIS  Encounter Diagnosis   Name Primary?    Muscle cramping Yes     Benign cramp fasciculation syndrome?  Potassium channelopathy?    TREATMENT/PLAN  Repeat Ck in one month.   Labs electrolytes, myositis panel  EMG legs, HIMANSHU.    Education was provided regarding the above impression/diagnosis and treatment options/plan were discussed.  All questions were answered during today's visit.  Patient  will contact clinic if any other questions or concerns.            Mark Maya,   Interventional Spine and Sports Medicine Specialist   Physical Medicine and Rehabilitation  [facility]  [address] [place] [id number]    [facility]  [address] [facility]. [address] [place] [address]

## 2024-05-31 ENCOUNTER — HOSPITAL ENCOUNTER (OUTPATIENT)
Dept: ULTRASOUND IMAGING | Facility: HOSPITAL | Age: 24
Discharge: HOME | End: 2024-05-31
Attending: PHYSICAL MEDICINE & REHABILITATION
Payer: COMMERCIAL

## 2024-05-31 DIAGNOSIS — R25.2 MUSCLE CRAMPING: ICD-10-CM

## 2024-05-31 PROCEDURE — 93923 UPR/LXTR ART STDY 3+ LVLS: CPT | Performed by: PHYSICAL MEDICINE & REHABILITATION

## 2024-09-13 ENCOUNTER — TELEPHONE (OUTPATIENT)
Dept: INTERNAL MEDICINE CLINIC | Facility: CLINIC | Age: 24
End: 2024-09-13

## 2024-09-13 NOTE — TELEPHONE ENCOUNTER
Patient dropped off health screening form for international travel to be completed by pcp. Patient states he is leaving for [place] on 9/22. Placed in provider folder for review.

## 2024-12-19 ENCOUNTER — TELEPHONE (OUTPATIENT)
Dept: INTERNAL MEDICINE CLINIC | Facility: CLINIC | Age: 24
End: 2024-12-19

## 2024-12-19 NOTE — TELEPHONE ENCOUNTER
Patient dropped off form for a [place] visa to be completed by pcp, patient requested for this to be completed same day, informed that no guarantees can be made for same day completion. Placed in provider folder for review.

## 2024-12-23 ENCOUNTER — MED REC SCAN ONLY (OUTPATIENT)
Dept: INTERNAL MEDICINE CLINIC | Facility: CLINIC | Age: 24
End: 2024-12-23

## 2025-04-17 ENCOUNTER — TELEPHONE (OUTPATIENT)
Facility: CLINIC | Age: 25
End: 2025-04-17

## 2025-04-17 DIAGNOSIS — M25.561 PAIN IN BOTH KNEES, UNSPECIFIED CHRONICITY: Primary | ICD-10-CM

## 2025-04-17 DIAGNOSIS — M25.562 PAIN IN BOTH KNEES, UNSPECIFIED CHRONICITY: Primary | ICD-10-CM

## 2025-04-17 NOTE — TELEPHONE ENCOUNTER
Patient Schedule for MEME knee pain.    Future Appointments   Date Time Provider Department Center   5/12/2025 10:00 AM [facility],  [facility] [address]     Please advise if imaging is needed.

## 2025-04-18 NOTE — TELEPHONE ENCOUNTER
Left voice mail about xray   Future Appointments   Date Time Provider Department Center   5/12/2025  9:00 AM [facility] XR [facility] XRAY [place]   5/12/2025  9:30 AM PF XR [facility] PF XRAY [place]   5/12/2025 10:00 AM [facility],  [facility] [address]

## 2025-05-16 ENCOUNTER — HOSPITAL ENCOUNTER (OUTPATIENT)
Dept: GENERAL RADIOLOGY | Age: 25
Discharge: HOME | End: 2025-05-16
Attending: FAMILY MEDICINE

## 2025-05-16 ENCOUNTER — OFFICE VISIT (OUTPATIENT)
Facility: CLINIC | Age: 25
End: 2025-05-16
Payer: COMMERCIAL

## 2025-05-16 VITALS — WEIGHT: 177 LBS | HEIGHT: 71 IN | BODY MASS INDEX: 24.78 KG/M2

## 2025-05-16 DIAGNOSIS — M25.561 PAIN IN BOTH KNEES, UNSPECIFIED CHRONICITY: ICD-10-CM

## 2025-05-16 DIAGNOSIS — M25.562 PAIN IN BOTH KNEES, UNSPECIFIED CHRONICITY: ICD-10-CM

## 2025-05-16 DIAGNOSIS — M25.9 MECHANICAL SYMPTOM OF KNEE JOINT: ICD-10-CM

## 2025-05-16 DIAGNOSIS — S83.206A POSITIVE MCMURRAY TEST OF RIGHT KNEE, INITIAL ENCOUNTER: ICD-10-CM

## 2025-05-16 DIAGNOSIS — M23.91 INTERNAL DERANGEMENT OF RIGHT KNEE: ICD-10-CM

## 2025-05-16 DIAGNOSIS — S83.207A POSITIVE MCMURRAY TEST OF LEFT KNEE, INITIAL ENCOUNTER: ICD-10-CM

## 2025-05-16 DIAGNOSIS — M23.92 INTERNAL DERANGEMENT OF LEFT KNEE: Primary | ICD-10-CM

## 2025-05-16 PROCEDURE — 73564 X-RAY EXAM KNEE 4 OR MORE: CPT | Performed by: FAMILY MEDICINE

## 2025-05-16 PROCEDURE — 99204 OFFICE O/P NEW MOD 45 MIN: CPT | Performed by: FAMILY MEDICINE

## 2025-05-16 PROCEDURE — 3008F BODY MASS INDEX DOCD: CPT | Performed by: FAMILY MEDICINE

## 2025-05-16 RX ORDER — MELOXICAM 15 MG/1
15 TABLET ORAL DAILY
Qty: 30 TABLET | Refills: 0 | Status: SHIPPED | OUTPATIENT
Start: 2025-05-16 | End: 2025-06-15

## 2025-05-16 NOTE — H&P
Sports Medicine Clinic Note    Subjective:    Chief Complaint: Bilateral knee pain    Date of Injury: July 2024    History: 24-year-old  currently competing in [place] presents with chronic medial knee pain since July 2024. Pain is exacerbated by running, stair climbing, and prolonged sitting such as during flights, but not noted with knee flexion. Symptoms intermittently disrupt sleep and are more pronounced following intense physical activity. Patient has previously attempted physical therapy focused on ACL prevention with limited benefit. Currently follows the 'Knees Over Toes' program on YouTube. Denies previous injections or MRI but he is interested in discussing this as he has a teammate who had similar issues and responded well to cortisone. Expresses concern regarding pain’s impact on performance. Interested in exploring PRP as a treatment option but is mindful of cost. Open to taking oral medications for symptom relief.    Objective:    Bilateral Knee Examination:    Inspection: Mild anterior swelling noted in both knees. No erythema or deformity.  Palpation: Tenderness localized to the medial aspects of both knees and patellar tendons proximally. No quadriceps tendon tenderness.  Range of Motion: Full range of motion. Pain reproduced with terminal extension.  Neurovascular: Intact distally in both lower extremities.  Special Tests: Positive Meghan's test bilaterally. Firm ligamentous endpoints.    Diagnostic Tests:    Radiographs of both knees personally reviewed. Demonstrate spurring along the anterior superior patella consistent with enthesopathy at the quadriceps tendon insertion. No joint space narrowing, fracture, subluxation, or effusion. Mild anterior soft tissue swelling noted.    Assessment:    Chronic bilateral patellar tendinopathy. Enthesopathy at the quadriceps tendon insertion. R/o internal derangement.    Plan:    Additional Workup: MRI of both knees to assess for  meniscal tears or chondral damage.  Therapy: Consider formal physical therapy with emphasis on patellar tendinopathy rehabilitation following MRI.  Medications: Trial of daily oral NSAIDs with food until further evaluation.  Bracing/Casting: None at this time.  Procedures: Discussed potential PRP injections pending MRI results. Viscosupplementation may be considered as an adjunct.  Activity Recommendations: Limit high-impact activities and modify training intensity as tolerated.    Follow-Up: Tentatively scheduled once MRI is completed.      Harris South DO, CAQSM   Primary Care Sports Medicine

## 2025-05-30 ENCOUNTER — OFFICE VISIT (OUTPATIENT)
Facility: CLINIC | Age: 25
End: 2025-05-30
Payer: COMMERCIAL

## 2025-05-30 VITALS — WEIGHT: 177 LBS | HEIGHT: 71 IN | BODY MASS INDEX: 24.78 KG/M2

## 2025-05-30 DIAGNOSIS — M22.41 CHONDROMALACIA OF RIGHT PATELLA: ICD-10-CM

## 2025-05-30 DIAGNOSIS — S86.811D PATELLAR TENDON STRAIN, RIGHT, SUBSEQUENT ENCOUNTER: Primary | ICD-10-CM

## 2025-05-30 DIAGNOSIS — S86.812D PATELLAR TENDON STRAIN, LEFT, SUBSEQUENT ENCOUNTER: ICD-10-CM

## 2025-05-30 PROCEDURE — 99214 OFFICE O/P EST MOD 30 MIN: CPT | Performed by: FAMILY MEDICINE

## 2025-05-30 PROCEDURE — 3008F BODY MASS INDEX DOCD: CPT | Performed by: FAMILY MEDICINE

## 2025-05-30 RX ORDER — CYCLOBENZAPRINE HCL 10 MG
10 TABLET ORAL NIGHTLY
Qty: 20 TABLET | Refills: 0 | Status: SHIPPED | OUTPATIENT
Start: 2025-05-30 | End: 2025-06-19

## 2025-05-30 NOTE — PROGRESS NOTES
Sports Medicine Clinic Note    Subjective:    Chief Complaint: Bilateral knee pain    Date of Injury: July 2024    History: 24-year-old  with chronic bilateral knee pain, more pronounced on the right. Pain is linked to high-impact activities such as running and jumping but improves temporarily after warm-ups. Symptoms began in July 2024 and have persisted despite prior physical therapy, which reportedly worsened his symptoms. He avoids NSAIDs due to prior adverse effects and has not used over-the-counter analgesics. MRI confirmed a partial tear of the right patellar tendon; results for the left knee MRI are pending. Patient is interested in dietary and supplement-based interventions for tendon recovery and has inquired about creatine, collagen peptides, and protein intake. Off-season training is ongoing, with regular soccer activity resuming mid-August.    Objective:    Bilateral Knee Examination:    Inspection: Mild anterior swelling noted in both knees. No erythema or deformity.  Palpation: Tenderness at the proximal patellar tendons bilaterally.  Range of Motion: Full range of motion. Pain elicited at terminal extension.  Neurovascular: Intact distally in both lower extremities.  Special Tests: Ligamentous endpoints are firm.    Diagnostic Tests:    Right knee MRI was reviewed and demonstrates abnormal fluid, inflammatory changes, and thickening at the proximal patellar tendon insertion with inflammation in the Hoffa's fat pad. Findings compatible with a partial proximal patellar tendon tear and tendinopathy. Mild chondromalacia of the lateral patella and a focal cartilage defect on the medial patella are also present. No other ligament or meniscal tears.    Previous radiographs of both knees demonstrated spurring along the anterior superior patella consistent with enthesopathy at the quadriceps tendon insertion. No joint space narrowing, fracture, subluxation, or effusion. Mild anterior  soft tissue swelling was noted.    Assessment:    Chronic bilateral patellar tendinopathy with partial proximal patellar tendon tear of the right knee  Chondromalacia patellae and focal cartilage defect of the right patella  Suspected bilateral enthesopathy at the quadriceps tendon insertion    Plan:    Additional Workup: Investigate and obtain MRI results for the left knee.  Therapy: Fax protocol-based patellar tendinopathy rehab program to [facility] in [place]. Emphasis on eccentric loading exercises. Avoid leg extension machines.  Medications: Prescribe tramadol as needed for pain management following upcoming procedures. Continue to avoid NSAIDs pre- and post-procedure.  Bracing/Casting: None at this time.  Procedures: Schedule PRP injections for both knees next Friday. Avoid NSAIDs for two weeks before and after the procedure.  Activity Recommendations: Modify training to avoid high-impact activities. Gradual return to full sport participation in alignment with symptom resolution.    Follow-Up: Tentatively scheduled once left knee MRI results are reviewed and PRP injections are completed.      Harris South DO, CAQSM   Primary Care Sports Medicine

## 2025-06-05 ENCOUNTER — TELEPHONE (OUTPATIENT)
Dept: ORTHOPEDICS CLINIC | Facility: CLINIC | Age: 25
End: 2025-06-05

## 2025-06-06 ENCOUNTER — OFFICE VISIT (OUTPATIENT)
Facility: CLINIC | Age: 25
End: 2025-06-06

## 2025-06-06 VITALS — WEIGHT: 177 LBS | HEIGHT: 71 IN | BODY MASS INDEX: 24.78 KG/M2

## 2025-06-06 DIAGNOSIS — S86.811D PATELLAR TENDON STRAIN, RIGHT, SUBSEQUENT ENCOUNTER: Primary | ICD-10-CM

## 2025-06-06 DIAGNOSIS — S86.812D PATELLAR TENDON STRAIN, LEFT, SUBSEQUENT ENCOUNTER: ICD-10-CM

## 2025-06-06 PROCEDURE — 99214 OFFICE O/P EST MOD 30 MIN: CPT | Performed by: FAMILY MEDICINE

## 2025-06-06 RX ORDER — DICLOFENAC SODIUM 30 MG/G
1 GEL TOPICAL 4 TIMES DAILY PRN
Qty: 100 G | Refills: 1 | Status: SHIPPED | OUTPATIENT
Start: 2025-06-06

## 2025-06-06 RX ORDER — METHOCARBAMOL 500 MG/1
500 TABLET, FILM COATED ORAL 4 TIMES DAILY
Qty: 120 TABLET | Refills: 0 | Status: SHIPPED | OUTPATIENT
Start: 2025-06-06

## 2025-06-06 NOTE — PROGRESS NOTES
Sports Medicine Clinic Note    Subjective:    Chief Complaint: Bilateral knee pain    History: 24-year-old  with chronic bilateral knee pain, more pronounced on the right. He reports ongoing symptoms consistent with patellar tendinopathy, exacerbated by running and sports. While he has adjusted his activity schedule to reduce symptom intensity, he notes increased demands this month requiring more effective pain control. Flexeril has been effective, but he is seeking a stronger alternative. He inquires about topical analgesics and expresses interest in pursuing MRI for the left knee, which remains incomplete. He has decided against PRP injection for now due to cost and time constraints.    Objective:    Bilateral Knee Examination:    Inspection: Mild anterior swelling in both knees. No erythema or deformity.  Palpation: Bilateral tenderness localized to the proximal patellar tendons.  Range of Motion: Full range of motion bilaterally. Pain reproduced at terminal extension.  Neurovascular: Intact distally in both lower extremities.  Special Tests: Negative Lachman, anterior drawer, and Meghan tests bilaterally. Firm ligamentous endpoints.    Diagnostic Tests:    No new testing.    Previous right knee MRI demonstrated abnormal fluid, inflammatory changes, and thickening at the proximal patellar tendon insertion with associated inflammation in the Hoffa’s fat pad. Findings consistent with a partial proximal patellar tendon tear and tendinopathy. Mild chondromalacia of the lateral patella and a focal cartilage defect on the medial patella also noted. No ligament or meniscal tears.    Previous radiographs of both knees demonstrated spurring along the anterior superior patella consistent with enthesopathy at the quadriceps tendon insertion. No joint space narrowing, fracture, subluxation, or effusion. Mild anterior soft tissue swelling was noted.    Assessment:    Chronic bilateral patellar  tendinopathy with partial proximal patellar tendon tear of the right knee  Chondromalacia patellae and focal cartilage defect of the right patella  Suspected bilateral enthesopathy at the quadriceps tendon insertion    Plan:    Additional Workup: Encourage completion of left knee MRI to assess for similar pathology.  Therapy: Continue protocol-based rehabilitation for patellar tendinopathy with emphasis on eccentric loading; avoid leg extension machines.  Medications: Prescribe methocarbamol up to four times daily as needed, with counseling regarding drowsiness and habit potential. Recommend 3% topical analgesic cream up to four times daily.  Bracing/Casting: None indicated at this time.  Procedures: PRP injection deferred; discussed as a future option with protocol information provided.  Activity Recommendations: Continue modified training schedule to avoid high-impact activities. Reassess readiness to return to full sport based on symptom response and imaging findings.    Follow-Up: Tentatively scheduled once left knee MRI is completed.      Harris South DO, CAQSM   Primary Care Sports Medicine

## 2025-06-12 DIAGNOSIS — M25.9 MECHANICAL SYMPTOM OF KNEE JOINT: ICD-10-CM

## 2025-06-12 DIAGNOSIS — S83.207A POSITIVE MCMURRAY TEST OF LEFT KNEE, INITIAL ENCOUNTER: ICD-10-CM

## 2025-06-12 DIAGNOSIS — M23.91 INTERNAL DERANGEMENT OF RIGHT KNEE: ICD-10-CM

## 2025-06-12 DIAGNOSIS — M23.92 INTERNAL DERANGEMENT OF LEFT KNEE: ICD-10-CM

## 2025-06-12 DIAGNOSIS — S83.206A POSITIVE MCMURRAY TEST OF RIGHT KNEE, INITIAL ENCOUNTER: ICD-10-CM

## 2025-06-12 RX ORDER — MELOXICAM 15 MG/1
15 TABLET ORAL DAILY
Qty: 30 TABLET | Refills: 0 | Status: SHIPPED | OUTPATIENT
Start: 2025-06-12 | End: 2025-07-12

## 2025-06-12 NOTE — TELEPHONE ENCOUNTER
Meloxicam 15mg    DOS: n/a  Last OV: 6/6/25 Patellar tendon strain, right, subsequent encounter   Last refill date: 5/16/25 #/refills: 30/0  Upcoming appt:   Future Appointments   Date Time Provider Department Center   7/7/2025 12:00 PM Harris South, DO [facility] [address]         Component  Ref Range & Units (hover) 5/9/24  8:35 AM   BUN 26 High    Creatinine 1.14   BUN/CREA Ratio 22.8 High    eGFR-Cr 93

## 2025-07-03 RX ORDER — METHOCARBAMOL 500 MG/1
500 TABLET, FILM COATED ORAL 4 TIMES DAILY
Qty: 120 TABLET | Refills: 0 | Status: SHIPPED | OUTPATIENT
Start: 2025-07-03

## 2025-07-03 NOTE — TELEPHONE ENCOUNTER
Methocarbamol 500 mg  DOS: N/A  Last OV: 06/06/25  Last refill date: 06/06/25     #/refills: 120/0  Upcoming appt:   Future Appointments   Date Time Provider Department Center   7/7/2025 12:00 PM Harris South DO [facility] [address]        0

## (undated) NOTE — Clinical Note
Patient requests PT order be faxed to [facility] in [place] it looks like fax number is [phone]

## (undated) NOTE — LETTER
Patient Name: Silvia Silva  : 2000  MRN: [id number]  Patient Address: [address] [place] [id number]      Coronavirus Disease 2019 (COVID-19)     [facility] is committed to the safety and well-being of our patients, members, e carefully. If your symptoms get worse, call your healthcare provider immediately. 3. Get rest and stay hydrated.    4. If you have a medical appointment, call the healthcare provider ahead of time and tell them that you have or may have COVID-19.  5. For m of fever-reducing medications; and  · Improvement in respiratory symptoms (e.g., cough, shortness of breath); and  · At least 10 days have passed since symptoms first appeared OR if asymptomatic patient or date of symptom onset is unclear then use 10 days donors must:    · Have had a confirmed diagnosis of COVID-19  · Be symptom-free for at least 14 days*    *Some people will be required to have a repeat COVID-19 test in order to be eligible to donate.  If you’re instructed by Dayana that a repeat test is r random. Researchers are trying to identify similarities between people with a Post-COVID condition to better understand if there are risk factors. How do I prevent a Post-COVID condition?   The best way to prevent the long-term symptoms of COVID-19 is

## (undated) NOTE — ED AVS SNAPSHOT
[facility] Emergency Department    [place]  [address] [address]    Phone:  [phone]    Fax:  [phone]           Governor Vivian   MRN: [id number]    Department:  [facility] Emergency Department   Date of Visit:  1/6/2017 covered by your plan. Please contact your insurance company to determine coverage for follow-up care and referrals.     [address] [facility] [place] [phone]  Pediatric [phone] Emergency Department   [phone]       To by a radiologist.  If there is a significant change in your reading, you will be contacted. Please make sure we have your correct phone number before you leave. After you leave, you should follow the attached instructions.      I have read and understand th [facility]     Sign up for [employer] access for your child. [employer] access allows you to view health information for your child from their recent   visit, view other health information and more.   To sign up or find more informati

## (undated) NOTE — ED AVS SNAPSHOT
[facility] Emergency Department    [place]  [address] [address]    Phone:  [phone]    Fax:  [phone]           Maicol Ambrosio   MRN: [id number]    Department:  [facility] Emergency Department   Date of Visit:  1/6/2017 IF THERE IS ANY CHANGE OR WORSENING OF YOUR CONDITION, CALL YOUR PRIMARY CARE PHYSICIAN AT ONCE OR RETURN IMMEDIATELY TO THE EMERGENCY DEPARTMENT.     If you have been prescribed any medication(s), please fill your prescription right away and begin taking t

## (undated) NOTE — Clinical Note
Patrick burgess I had a quick question hoping maybe you could point me in the right direction - this patient claims both his knee MRIs were done but I only have the right knee results. Is there any way for me to verify that? He is adamant that both MRIs were completed